# Patient Record
Sex: FEMALE | Race: WHITE | Employment: FULL TIME | URBAN - METROPOLITAN AREA
[De-identification: names, ages, dates, MRNs, and addresses within clinical notes are randomized per-mention and may not be internally consistent; named-entity substitution may affect disease eponyms.]

---

## 2020-08-25 ENCOUNTER — HOSPITAL ENCOUNTER (OUTPATIENT)
Dept: NON INVASIVE DIAGNOSTICS | Age: 45
Discharge: HOME | End: 2020-08-25
Payer: COMMERCIAL

## 2020-08-25 LAB
LV EF: 58 %
LVEF MODALITY: NORMAL

## 2020-08-25 PROCEDURE — 6360000004 HC RX CONTRAST MEDICATION: Performed by: INTERNAL MEDICINE

## 2020-08-25 PROCEDURE — 93017 CV STRESS TEST TRACING ONLY: CPT

## 2020-08-25 PROCEDURE — C8928 TTE W OR W/O FOL W/CON,STRES: HCPCS

## 2020-08-25 RX ADMIN — PERFLUTREN 2.2 MG: 6.52 INJECTION, SUSPENSION INTRAVENOUS at 11:50

## 2020-08-26 ENCOUNTER — OFFICE VISIT (OUTPATIENT)
Dept: CARDIOLOGY CLINIC | Age: 45
End: 2020-08-26
Payer: COMMERCIAL

## 2020-08-26 VITALS
BODY MASS INDEX: 32.3 KG/M2 | HEART RATE: 120 BPM | SYSTOLIC BLOOD PRESSURE: 120 MMHG | WEIGHT: 189.2 LBS | HEIGHT: 64 IN | TEMPERATURE: 98.3 F | DIASTOLIC BLOOD PRESSURE: 80 MMHG

## 2020-08-26 PROCEDURE — 99203 OFFICE O/P NEW LOW 30 MIN: CPT | Performed by: INTERNAL MEDICINE

## 2020-08-26 RX ORDER — HYDROCHLOROTHIAZIDE 25 MG/1
25 TABLET ORAL PRN
COMMUNITY
Start: 2020-07-16 | End: 2022-09-29 | Stop reason: SDUPTHER

## 2020-08-26 RX ORDER — ESCITALOPRAM OXALATE 20 MG/1
20 TABLET ORAL DAILY
COMMUNITY
Start: 2020-02-26

## 2020-08-26 RX ORDER — VALSARTAN 160 MG/1
160 TABLET ORAL DAILY
COMMUNITY
Start: 2020-08-13

## 2020-08-26 RX ORDER — FUROSEMIDE 20 MG/1
20 TABLET ORAL DAILY
COMMUNITY
Start: 2020-08-06 | End: 2022-09-29 | Stop reason: SDUPTHER

## 2020-08-26 RX ORDER — POTASSIUM CHLORIDE 750 MG/1
10 TABLET, FILM COATED, EXTENDED RELEASE ORAL DAILY
COMMUNITY
Start: 2019-01-27 | End: 2022-09-29 | Stop reason: SDUPTHER

## 2020-08-26 NOTE — PROGRESS NOTES
7/16/20  Yes Historical Provider, MD   potassium chloride (KLOR-CON) 10 MEQ extended release tablet Take 10 mEq by mouth 2 times daily 1/27/19  Yes Historical Provider, MD   valsartan (DIOVAN) 160 MG tablet Take 160 mg by mouth daily 8/13/20  Yes Historical Provider, MD        Allergies:  Adhesive tape; Cephalosporins; and Penicillins     Review of Systems:   · Constitutional: there has been no unanticipated weight loss. There's been no change in energy level, sleep pattern, or activity level. · Eyes: No visual changes or diplopia. No scleral icterus. · ENT: No Headaches, hearing loss or vertigo. No mouth sores or sore throat. · Cardiovascular: Reviewed in HPI  · Respiratory: No cough or wheezing, no sputum production. No hematemesis. · Gastrointestinal: No abdominal pain, appetite loss, blood in stools. No change in bowel or bladder habits. · Genitourinary: No dysuria, trouble voiding, or hematuria. · Musculoskeletal:  No gait disturbance, weakness or joint complaints. · Integumentary: No rash or pruritis. · Neurological: No headache, diplopia, change in muscle strength, numbness or tingling. No change in gait, balance, coordination, mood, affect, memory, mentation, behavior. · Psychiatric: No anxiety, no depression. · Endocrine: No malaise, fatigue or temperature intolerance. No excessive thirst, fluid intake, or urination. No tremor. · Hematologic/Lymphatic: No abnormal bruising or bleeding, blood clots or swollen lymph nodes. · Allergic/Immunologic: No nasal congestion or hives.     Physical Examination:    Vitals:    08/26/20 0910   BP: 120/80   Pulse: 120   Temp: 98.3 °F (36.8 °C)        Wt Readings from Last 1 Encounters:   08/26/20 189 lb 3.2 oz (85.8 kg)       Constitutional and General Appearance: NAD    Skin:good turgor,intact without lesions  HEENT: EOMI ,normal  Neck:no JVD       Respiratory:  · Normal excursion and expansion without use of accessory muscles  · Resp Auscultation: Normal in 6 months. I appreciate the opportunity of cooperating in the care of this individual.    Aram Beatty. Aaron Fraser M.D., [address] [facility]  Patient's problem list, medications, allergies, past medical, surgical, social and family histories were reviewed and updated as appropriate. Scribe's attestation: This note was scribed in the presence of Dr. Aaron Fraser MD, by Carlotta Bunn RN. The scribe's documentation has been prepared under my direction and personally reviewed by me in its entirety. I confirm that the note above accurately reflects all work, treatment, procedures, and medical decision making performed by me.

## 2021-04-28 ENCOUNTER — OFFICE VISIT (OUTPATIENT)
Dept: CARDIOLOGY CLINIC | Age: 46
End: 2021-04-28
Payer: COMMERCIAL

## 2021-04-28 VITALS
HEIGHT: 64 IN | BODY MASS INDEX: 29.71 KG/M2 | DIASTOLIC BLOOD PRESSURE: 70 MMHG | SYSTOLIC BLOOD PRESSURE: 100 MMHG | OXYGEN SATURATION: 98 % | WEIGHT: 174 LBS | HEART RATE: 103 BPM

## 2021-04-28 DIAGNOSIS — G47.33 OSA (OBSTRUCTIVE SLEEP APNEA): ICD-10-CM

## 2021-04-28 DIAGNOSIS — R07.9 CHEST PAIN, UNSPECIFIED TYPE: Primary | ICD-10-CM

## 2021-04-28 DIAGNOSIS — I10 ESSENTIAL HYPERTENSION: ICD-10-CM

## 2021-04-28 PROCEDURE — 99214 OFFICE O/P EST MOD 30 MIN: CPT | Performed by: INTERNAL MEDICINE

## 2021-04-28 NOTE — PROGRESS NOTES
Cephalosporins, and Penicillins     Review of Systems:   · Constitutional: there has been no unanticipated weight loss. There's been no change in energy level, sleep pattern, or activity level. · Eyes: No visual changes or diplopia. No scleral icterus. · ENT: No Headaches, hearing loss or vertigo. No mouth sores or sore throat. · Cardiovascular: Reviewed in HPI  · Respiratory: No cough or wheezing, no sputum production. No hematemesis. · Gastrointestinal: No abdominal pain, appetite loss, blood in stools. No change in bowel or bladder habits. · Genitourinary: No dysuria, trouble voiding, or hematuria. · Musculoskeletal:  No gait disturbance, weakness or joint complaints. · Integumentary: No rash or pruritis. · Neurological: No headache, diplopia, change in muscle strength, numbness or tingling. No change in gait, balance, coordination, mood, affect, memory, mentation, behavior. · Psychiatric: No anxiety, no depression. · Endocrine: No malaise, fatigue or temperature intolerance. No excessive thirst, fluid intake, or urination. No tremor. · Hematologic/Lymphatic: No abnormal bruising or bleeding, blood clots or swollen lymph nodes. · Allergic/Immunologic: No nasal congestion or hives.     Physical Examination:    Vitals:    04/28/21 1008   BP: 100/70   Pulse: 103   SpO2: 98%        Wt Readings from Last 1 Encounters:   04/28/21 174 lb (78.9 kg)       Constitutional and General Appearance: NAD    Skin:good turgor,intact without lesions  HEENT: EOMI ,normal  Neck:no JVD       Respiratory:  · Normal excursion and expansion without use of accessory muscles  · Resp Auscultation: Normal breath sounds without dullness  Cardiovascular:  · The apical impulses not displaced  · Heart tones are crisp and normal  · Cervical veins are not engorged  · The carotid upstroke is normal in amplitude and contour without delay or bruit  · Peripheral pulses are symmetrical and full  · There is no clubbing, cyanosis of the extremities. · No edema on exam today   · Femoral Arteries: 2+ and equal  · Pedal Pulses: 2+ and equal   Abdomen:  · No masses or tenderness  · Liver/Spleen: No Abnormalities Noted  Neurological/Psychiatric:  · Alert and oriented in all spheres  · Moves all extremities well  · Exhibits normal gait balance and coordination  · No abnormalities of mood, affect, memory, mentation, or behavior are noted    Stress Echo 8/25/20   Baseline resting echocardiogram shows normal global LV systolic function   with an ejection fraction of 55-60% and uniform myocardial segmental wall   motion. Following stress there was uniform augmentation of all myocardial   segments with appropriate hyperdynamic LV systolic response to stress with   an ejection fraction >65%. Normal stress ECHO. Assessment:    1. Chest pain/Edema- no edema on exam, 8/25/20>  Normal stress ECHO  Edema likely related to venous insufficiency, not related to CHF. CP GI / costochondritis    2. Essential hypertension -controlled-Blood pressure 100/70, pulse 103, height 5' 4\" (1.626 m), weight 174 lb (78.9 kg), SpO2 98 %. 3. WALDEMAR (obstructive sleep apnea) -does not wear CPAP          Plan:  Norberto Alexander has a stable cardiac status. Cardiac test and lab results personally reviewed by me during this office visit and discussed. No med changes   Continue risk factor modifications- monitor salt in your diet. Can also try compression stockings. Call for any change in symptoms, call to report any changes in shortness of breath or development of chest pain with activity. Return for regular follow up in 12 months. I appreciate the opportunity of cooperating in the care of this individual.    Harini Vora. Marion Polanco M.D., [address] [facility]    Patient's problem list, medications, allergies, past medical, surgical, social and family histories were reviewed and updated as appropriate. Scribe's attestation:  This note was scribed in the presence of Dr. Marion Polanco MD, by Jose Vargas Aram Hodgson. The scribe's documentation has been prepared under my direction and personally reviewed by me in its entirety. I confirm that the note above accurately reflects all work, treatment, procedures, and medical decision making performed by me.

## 2022-06-24 PROBLEM — I10 HYPERTENSION: Status: ACTIVE | Noted: 2022-06-24

## 2022-06-24 PROBLEM — G47.33 OSA (OBSTRUCTIVE SLEEP APNEA): Status: ACTIVE | Noted: 2022-06-24

## 2022-08-22 ENCOUNTER — TELEPHONE (OUTPATIENT)
Dept: CARDIOLOGY CLINIC | Age: 47
End: 2022-08-22

## 2022-08-22 NOTE — TELEPHONE ENCOUNTER
Pt called stating that her heart rate is runnin - 140 with any activity. Pt does have a scheduled appt on . Per Pt if she can get in this week that would be appreciated except for Friday she is scheduled for another appt.   Please advise

## 2022-08-22 NOTE — TELEPHONE ENCOUNTER
Called patient to verify symptoms patient stated she is not having chest pain her heart rate keeps going up.

## 2022-08-23 ENCOUNTER — NURSE ONLY (OUTPATIENT)
Dept: CARDIOLOGY CLINIC | Age: 47
End: 2022-08-23
Payer: COMMERCIAL

## 2022-08-23 DIAGNOSIS — R00.2 PALPITATIONS: Primary | ICD-10-CM

## 2022-08-23 PROCEDURE — 93242 EXT ECG>48HR<7D RECORDING: CPT | Performed by: INTERNAL MEDICINE

## 2022-08-23 PROCEDURE — 93000 ELECTROCARDIOGRAM COMPLETE: CPT | Performed by: INTERNAL MEDICINE

## 2022-08-23 NOTE — TELEPHONE ENCOUNTER
LMOM for patient to return call in regards to message below patient need to come into office for EKG and a 7 day monitor Per LES.

## 2022-08-24 NOTE — PROGRESS NOTES
[address]   Cardiac Follow Up     Referring Provider:  JUDE Angeles APRN     Cc-palpitations       History of Present Illness:  Shaunna Baker is a 49 y.o. female here in follow up  for chest pain and edema. She has a history of hypertension. Breast cancer s /p mastectomy. Last ov - her swelling has improved, she is taking lasix daily. Today she reports she has been getting notifications on her apple watch that her HR was elevated. It also recorded an ekg which shows a sinus rhythm, she also feels palpitations during this time. She just finished one round of chemo for recurrent breast cancer and starts a new one soon. Past Medical History:   has a past medical history of Breast cancer ([facility] [address] and Hypertension. Surgical History:   has a past surgical history that includes Mastectomy. bilateral mastectomy    Social History:   reports that she has quit smoking. She has never used smokeless tobacco. She reports current alcohol use. Family History:  family history includes Fainting in her father; Heart Disease in her mother; Hypertension in her father and mother. Home Medications:  Prior to Admission medications    Medication Sig Start Date End Date Taking?  Authorizing Provider   Cyanocobalamin 1000 MCG SUBL Place 1,000 mcg under the tongue daily 7/20/20   Historical Provider, MD   escitalopram (LEXAPRO) 20 MG tablet Take 20 mg by mouth daily 2/26/20   Historical Provider, MD   furosemide (LASIX) 20 MG tablet Take 20 mg by mouth daily 8/6/20   Historical Provider, MD   hydroCHLOROthiazide (HYDRODIURIL) 25 MG tablet Take 25 mg by mouth as needed  7/16/20   Historical Provider, MD   potassium chloride (KLOR-CON) 10 MEQ extended release tablet Take 10 mEq by mouth daily  1/27/19   Historical Provider, MD   valsartan (DIOVAN) 160 MG tablet Take 160 mg by mouth daily 8/13/20   Historical Provider, MD        Allergies:  Adhesive tape, Cephalosporins, and Penicillins     Review of Systems:   Constitutional: there has been no unanticipated weight loss. There's been no change in energy level, sleep pattern, or activity level. Eyes: No visual changes or diplopia. No scleral icterus. ENT: No Headaches, hearing loss or vertigo. No mouth sores or sore throat. Cardiovascular: Reviewed in HPI  Respiratory: No cough or wheezing, no sputum production. No hematemesis. Gastrointestinal: No abdominal pain, appetite loss, blood in stools. No change in bowel or bladder habits. Genitourinary: No dysuria, trouble voiding, or hematuria. Musculoskeletal:  No gait disturbance, weakness or joint complaints. Integumentary: No rash or pruritis. Neurological: No headache, diplopia, change in muscle strength, numbness or tingling. No change in gait, balance, coordination, mood, affect, memory, mentation, behavior. Psychiatric: No anxiety, no depression. Endocrine: No malaise, fatigue or temperature intolerance. No excessive thirst, fluid intake, or urination. No tremor. Hematologic/Lymphatic: No abnormal bruising or bleeding, blood clots or swollen lymph nodes. Allergic/Immunologic: No nasal congestion or hives. Physical Examination:    Vitals:    08/29/22 0845   BP: (!) 110/58   Pulse: 86   SpO2: 98%            Wt Readings from Last 1 Encounters:   04/28/21 174 lb (78.9 kg)       Constitutional and General Appearance: NAD    Skin:good turgor,intact without lesions  HEENT: EOMI ,normal  Neck:no JVD       Respiratory:  Normal excursion and expansion without use of accessory muscles  Resp Auscultation: Normal breath sounds without dullness  Cardiovascular: The apical impulses not displaced  Heart tones are crisp and normal  Cervical veins are not engorged  The carotid upstroke is normal in amplitude and contour without delay or bruit  Peripheral pulses are symmetrical and full  There is no clubbing, cyanosis of the extremities.   No edema on exam today   Femoral Arteries: 2+ and equal  Pedal Pulses: 2+ and equal   Abdomen:  No masses or tenderness  Liver/Spleen: No Abnormalities Noted  Neurological/Psychiatric:  Alert and oriented in all spheres  Moves all extremities well  Exhibits normal gait balance and coordination  No abnormalities of mood, affect, memory, mentation, or behavior are noted    Stress Echo 8/25/20   Baseline resting echocardiogram shows normal global LV systolic function   with an ejection fraction of 55-60% and uniform myocardial segmental wall   motion. Following stress there was uniform augmentation of all myocardial   segments with appropriate hyperdynamic LV systolic response to stress with   an ejection fraction >65%. Normal stress ECHO. Assessment:    1. Chest pain/Edema- no edema on exam, 8/25/20>  Normal stress ECHO  Edema likely related to venous insufficiency, not related to CHF. CP GI / costochondritis    2. Essential hypertension -controlled-There were no vitals taken for this visit. 3. WALDEMAR (obstructive sleep apnea) -does not wear CPAP          Plan:  Richar Cevallos has a stable cardiac status. Cardiac test and lab results personally reviewed by me during this office visit and discussed. No med changes   Continue risk factor modifications- monitor salt in your diet. Can also try compression stockings. Call for any change in symptoms, call to report any changes in shortness of breath or development of chest pain with activity. Return for regular follow up in 12 months. I appreciate the opportunity of cooperating in the care of this individual.    Willi Ferris. Gris Ta M.D., [facility]    Patient's problem list, medications, allergies, past medical, surgical, social and family histories were reviewed and updated as appropriate. Scribe's Attestation: This note was scribed in the presence of Dr. Shilo Causey MD by Lella Dakin, RN. The scribe's documentation has been prepared under my direction and personally reviewed by me in its entirety.  I confirm that the note above accurately reflects all work, treatment, procedures, and medical decision making performed by me.

## 2022-08-29 ENCOUNTER — OFFICE VISIT (OUTPATIENT)
Dept: CARDIOLOGY CLINIC | Age: 47
End: 2022-08-29
Payer: COMMERCIAL

## 2022-08-29 VITALS
DIASTOLIC BLOOD PRESSURE: 58 MMHG | WEIGHT: 170.5 LBS | HEART RATE: 86 BPM | HEIGHT: 64 IN | SYSTOLIC BLOOD PRESSURE: 110 MMHG | OXYGEN SATURATION: 98 % | BODY MASS INDEX: 29.11 KG/M2

## 2022-08-29 DIAGNOSIS — R00.0 TACHYCARDIA: ICD-10-CM

## 2022-08-29 DIAGNOSIS — R00.2 PALPITATIONS: Primary | ICD-10-CM

## 2022-08-29 PROCEDURE — 99214 OFFICE O/P EST MOD 30 MIN: CPT | Performed by: INTERNAL MEDICINE

## 2022-08-29 RX ORDER — OLANZAPINE 5 MG/1
10 TABLET ORAL NIGHTLY
COMMUNITY
Start: 2022-07-14

## 2022-08-29 RX ORDER — SCOLOPAMINE TRANSDERMAL SYSTEM 1 MG/1
PATCH, EXTENDED RELEASE TRANSDERMAL
COMMUNITY
Start: 2022-08-18

## 2022-08-29 RX ORDER — PROCHLORPERAZINE MALEATE 10 MG
10 TABLET ORAL EVERY 6 HOURS PRN
COMMUNITY
Start: 2022-07-01

## 2022-09-06 PROCEDURE — 93244 EXT ECG>48HR<7D REV&INTERPJ: CPT | Performed by: INTERNAL MEDICINE

## 2022-09-27 ENCOUNTER — TELEPHONE (OUTPATIENT)
Dept: CARDIOLOGY CLINIC | Age: 47
End: 2022-09-27

## 2022-09-27 NOTE — TELEPHONE ENCOUNTER
Called to touch based with Dewayne Gaffney. Her sister answered the phone and states they are going back to be seen right now at hospital in [place].

## 2022-09-27 NOTE — TELEPHONE ENCOUNTER
Patient called in and stated that her HR was running in the 158-170 she was feeling lightheaded and loopy.  Patient stated that she was going to go to the ED and she would call our office later to make an appointment if she needed to

## 2022-09-29 ENCOUNTER — OFFICE VISIT (OUTPATIENT)
Dept: CARDIOLOGY CLINIC | Age: 47
End: 2022-09-29
Payer: COMMERCIAL

## 2022-09-29 VITALS
WEIGHT: 173.3 LBS | BODY MASS INDEX: 29.59 KG/M2 | DIASTOLIC BLOOD PRESSURE: 70 MMHG | OXYGEN SATURATION: 99 % | SYSTOLIC BLOOD PRESSURE: 114 MMHG | HEART RATE: 102 BPM | HEIGHT: 64 IN

## 2022-09-29 DIAGNOSIS — R60.0 LOWER LEG EDEMA: ICD-10-CM

## 2022-09-29 DIAGNOSIS — G47.33 OSA (OBSTRUCTIVE SLEEP APNEA): ICD-10-CM

## 2022-09-29 DIAGNOSIS — R00.0 TACHYCARDIA: Primary | ICD-10-CM

## 2022-09-29 PROCEDURE — 99214 OFFICE O/P EST MOD 30 MIN: CPT | Performed by: NURSE PRACTITIONER

## 2022-09-29 RX ORDER — HYDROCHLOROTHIAZIDE 25 MG/1
25 TABLET ORAL PRN
Qty: 30 TABLET | Refills: 2 | Status: SHIPPED | OUTPATIENT
Start: 2022-09-29

## 2022-09-29 RX ORDER — FUROSEMIDE 20 MG/1
20 TABLET ORAL DAILY
Qty: 60 TABLET | Refills: 3 | Status: SHIPPED | OUTPATIENT
Start: 2022-09-29

## 2022-09-29 RX ORDER — POTASSIUM CHLORIDE 750 MG/1
10 TABLET, FILM COATED, EXTENDED RELEASE ORAL DAILY
Qty: 60 TABLET | Refills: 3 | Status: SHIPPED | OUTPATIENT
Start: 2022-09-29 | End: 2022-10-16 | Stop reason: SDUPTHER

## 2022-09-29 RX ORDER — METOPROLOL SUCCINATE 25 MG/1
TABLET, EXTENDED RELEASE ORAL
COMMUNITY
Start: 2022-09-28

## 2022-09-29 NOTE — PROGRESS NOTES
[address]     Outpatient Follow Up Note    CHIEF COMPLAINT / HPI:  Follow Up secondary to tachycardia     Subjective:   Eulalia Stanley is 49 y.o. female who presents today with a history of HNT, tachycardia, breast cancer s/p mastectomy. Admitted 9/27/22 with tachycardia at [facility]. She had just received chemo (Neulasta) 9/26. She was started on Toprol 25 BID. Lasix, diovan, HTCZ stopped due to hypotension. Today, Ms Yuki Shah says her tachycardia is better controlled on Toprol. She complains of swelling and weight gain since stopping her diuretics. She also notices some shortness of breath when she is tachycardic. Ms Ykui Shah has two more treatments of taxol and then will have radiation. With regard to medication therapy the patient has been compliant with prescribed regimen. They have tolerated therapy to date.      Past Medical History:   Diagnosis Date    Breast cancer ([facility] [address]     Hypertension      Social History:    Social History     Tobacco Use   Smoking Status Former   Smokeless Tobacco Never     Current Medications:  Current Outpatient Medications   Medication Sig Dispense Refill    prochlorperazine (COMPAZINE) 10 MG tablet Take 10 mg by mouth every 6 hours as needed      scopolamine (TRANSDERM-SCOP) transdermal patch       escitalopram (LEXAPRO) 20 MG tablet Take 20 mg by mouth daily      metoprolol succinate (TOPROL XL) 25 MG extended release tablet       OLANZapine (ZYPREXA) 5 MG tablet Take 10 mg by mouth nightly (Patient not taking: Reported on 9/29/2022)      Cyanocobalamin 1000 MCG SUBL Place 1,000 mcg under the tongue daily (Patient not taking: Reported on 9/29/2022)      furosemide (LASIX) 20 MG tablet Take 20 mg by mouth daily (Patient not taking: Reported on 9/29/2022)      hydroCHLOROthiazide (HYDRODIURIL) 25 MG tablet Take 25 mg by mouth as needed  (Patient not taking: Reported on 9/29/2022)      potassium chloride (KLOR-CON) 10 MEQ extended release tablet Take 10 mEq by mouth daily  (Patient not taking: Reported on 9/29/2022)      valsartan (DIOVAN) 160 MG tablet Take 160 mg by mouth daily (Patient not taking: Reported on 9/29/2022)       No current facility-administered medications for this visit. REVIEW OF SYSTEMS:    CONSTITUTIONAL: No major weight gain or loss, fatigue, weakness, night sweats or fever. HEENT: No new vision difficulties or ringing in the ears. RESPIRATORY: No new SOB, PND, orthopnea or cough. CARDIOVASCULAR: See HPI  GI: No nausea, vomiting, diarrhea, constipation, abdominal pain or changes in bowel habits. : No urinary frequency, urgency, incontinence hematuria or dysuria. SKIN: No cyanosis or skin lesions. MUSCULOSKELETAL: No new muscle or joint pain. NEUROLOGICAL: No syncope or TIA-like symptoms. PSYCHIATRIC: No anxiety, pain, insomnia or depression    Objective:   PHYSICAL EXAM:      Wt Readings from Last 3 Encounters:   09/29/22 173 lb 4.8 oz (78.6 kg)   08/29/22 170 lb 8 oz (77.3 kg)   04/28/21 174 lb (78.9 kg)          VITALS:  /70 (Site: Left Upper Arm, Position: Sitting, Cuff Size: Large Adult)   Pulse (!) 102   Ht 5' 4\" (1.626 m)   Wt 173 lb 4.8 oz (78.6 kg)   SpO2 99%   BMI 29.75 kg/m²   CONSTITUTIONAL: Cooperative, no apparent distress, and appears well nourished / developed  NEUROLOGIC:  Awake and orientated to person, place and time. PSYCH: Calm affect. SKIN: Warm and dry. HEENT: Sclera non-icteric, normocephalic, neck supple, no elevation of JVP, normal carotid pulses with no bruits and thyroid normal size. LUNGS:  No increased work of breathing and clear to auscultation, no crackles or wheezing  CARDIOVASCULAR:  Regular rate and rhythm with no murmurs, gallops, rubs, or abnormal heart sounds, normal PMI. The apical impulses not displaced  Heart tones are crisp and normal  Cervical veins are not engorged  The carotid upstroke is normal in amplitude and contour without delay or bruit  JVP is not elevated  ABDOMEN:  Normal bowel sounds, non-distended and non-tender to palpation  EXT: No edema, no calf tenderness. Pulses are present bilaterally. DATA:    No results found for: ALT, AST, GGT, ALKPHOS, BILITOT  Lab Results   Component Value Date    CREATININE 0.7 2011    BUN 17 2011     2011    K 3.9 2011     2011    CO2 27 2011     No results found for: TSH, F8AEAON, J5EIWMN, THYROIDAB  No results found for: WBC, HGB, HCT, MCV, PLT  No components found for: CHLPL  No results found for: TRIG  No results found for: HDL  No results found for: LDLCALC  No results found for: LABVLDL       No results found for: BNP  Radiology Review:  Pertinent images / reports were reviewed as a part of this visit and reveals the following:    Last Echo: 22  Summary:   Overall left ventricular ejection fraction is estimated to be 60-65%. Left ventricular systolic function is normal. (LVEF>/=55%)   Mild concentric left ventricular hypertrophy. Left ventricular wall motion is normal.   Diastolic function is impaired and classified as Grade 1 (impaired   relaxation). Last Stress Echo Test: 22    Summary   Baseline resting echocardiogram shows normal global LV systolic function with an ejection fraction of 55-60% and uniform myocardial segmental wall motion. Following stress there was uniform augmentation of all myocardial segments with appropriate hyperdynamic LV systolic response to stress with an ejection fraction >65%. Normal stress ECHO. Last EC22  Sinus  Rhythm   Low voltage in precordial leads. Assessment:      Diagnosis Orders   1. Tachycardia   ~ better controlled on Toprol 25 BID       2. Lower leg edema   ~ worsening, + slight weight gain  ~ diuretics stopped at discharge from hospital       3. WALDEMAR (obstructive sleep apnea)   ~ encouraged compliance with CPAP         I had the opportunity to review the clinical symptoms and presentation of Satya Robb.    Plan: Resume lasix 20 mg daily along with potassium   OK to take HCTZ PRN as you had been  Monitor BP 2 hours after you take your medication. Let me know if BP dropping too low/you are symptomatic. If BP continues to rise, will resume valsartan. RTO 6 months with Dr Helder Guzman     Overall the patient is stable from CV standpoint    I have addresed the patient's cardiac risk factors and adjusted pharmacologic treatment as needed. In addition, I have reinforced the need for patient directed risk factor modification. Further evaluation will be based upon the patient's clinical course and testing results. All questions and concerns were addressed to the patient/family (sister)    The patient is not currently smoking. The risks related to smoking were reviewed with the patient. Recommend maintaining a smoke-free lifestyle. Patient is on a beta-blocker  Patient is on an ace-i/ARB  Patient is not on a statin; no HLD    The patient verbalizes understanding not to stop medications without discussing with us. Discussed exercise: 30-60 minutes 7 days/week  Discussed Low saturated fat/HUGO diet. Thank you for allowing to us to participate in the care of Eulalia Stanley.     Electronically signed by JUDE Reinoso CNP on 9/29/2022 at 1:06 PM     Documentation of today's visit sent to PCP

## 2022-10-03 PROBLEM — R00.0 TACHYCARDIA: Status: ACTIVE | Noted: 2022-10-03

## 2022-10-03 PROBLEM — R60.0 LOWER LEG EDEMA: Status: ACTIVE | Noted: 2022-10-03

## 2022-10-17 RX ORDER — POTASSIUM CHLORIDE 750 MG/1
10 TABLET, FILM COATED, EXTENDED RELEASE ORAL DAILY
Qty: 60 TABLET | Refills: 5 | Status: SHIPPED | OUTPATIENT
Start: 2022-10-17 | End: 2022-10-19

## 2022-10-17 NOTE — TELEPHONE ENCOUNTER
Received refill request for Potassium Chloride from [id number] : 9/29/22 NPKA    Last Labs : 9/23/22 Care everywhere    Last Refill : 9/29/22 60w3 refills     Next OV : 3/29/23 LES

## 2022-10-19 RX ORDER — POTASSIUM CHLORIDE 1500 MG/1
20 TABLET, FILM COATED, EXTENDED RELEASE ORAL DAILY
Qty: 90 TABLET | Refills: 2 | Status: SHIPPED | OUTPATIENT
Start: 2022-10-19

## 2022-11-08 NOTE — TELEPHONE ENCOUNTER
Received refill request for Metoprolol from [facility].     Last ov: 08/29/2022 LES    Last Refill: Historical medication    Next appointment: 03/29/2023 LES

## 2022-11-08 NOTE — TELEPHONE ENCOUNTER
Medication Refill    Medication needing refilled:  metoprolol succinate (TOPROL XL)    Dosage of the medication:  25 MG extended release tablet     How are you taking this medication (QD, BID, TID, QID, PRN): 1 tablet daily    30 or 90 day supply called in:  30 day    When will you run out of your medication:  Thursday    Which Pharmacy are we sending the medication to?:    [id number], [address] [place] [address] [phone]   [address] [facility], [facility] [address]   Phone:  [phone]  Fax:  [phone]

## 2022-11-09 RX ORDER — METOPROLOL SUCCINATE 25 MG/1
25 TABLET, EXTENDED RELEASE ORAL DAILY
Qty: 30 TABLET | Refills: 5 | Status: SHIPPED | OUTPATIENT
Start: 2022-11-09

## 2022-12-12 ENCOUNTER — PROCEDURE VISIT (OUTPATIENT)
Dept: CARDIOLOGY CLINIC | Age: 47
End: 2022-12-12

## 2022-12-12 DIAGNOSIS — R00.0 TACHYCARDIA: ICD-10-CM

## 2022-12-12 DIAGNOSIS — R00.2 PALPITATIONS: ICD-10-CM

## 2023-02-08 NOTE — TELEPHONE ENCOUNTER
Received refill request for Furosemide 20mg and Potassium 20meq from [facility] on [address] pharmacy.      Last OV:  8- LES    Next OV: 3- LES    Last Labs: 12-6-2011 BMP    Last Filled:  9- Furosemide NPKA                    10- Pot Chlor NPKA

## 2023-02-08 NOTE — TELEPHONE ENCOUNTER
Received refill request for Metoprolol Succinate 25mg from [facility] [address] pharmacy.      Last OV: 8- LES    Next OV: 3- ANNA    Last Labs: 12-6-2011 BMP    Last Filled:  11-9-2022 LES

## 2023-02-09 ENCOUNTER — PATIENT MESSAGE (OUTPATIENT)
Dept: CARDIOLOGY CLINIC | Age: 48
End: 2023-02-09

## 2023-02-09 RX ORDER — FUROSEMIDE 20 MG/1
20 TABLET ORAL DAILY
Qty: 60 TABLET | Refills: 3 | Status: SHIPPED | OUTPATIENT
Start: 2023-02-09

## 2023-02-09 RX ORDER — POTASSIUM CHLORIDE 1500 MG/1
20 TABLET, FILM COATED, EXTENDED RELEASE ORAL DAILY
Qty: 90 TABLET | Refills: 2 | Status: SHIPPED | OUTPATIENT
Start: 2023-02-09

## 2023-02-09 RX ORDER — METOPROLOL SUCCINATE 25 MG/1
25 TABLET, EXTENDED RELEASE ORAL DAILY
Qty: 90 TABLET | Refills: 3 | Status: SHIPPED | OUTPATIENT
Start: 2023-02-09

## 2023-02-10 NOTE — TELEPHONE ENCOUNTER
From: Anastasiia Camacho  To: Dr. Barajas Cast: 2/9/2023 5:51 PM EST  Subject: Potassium     I wanted to get your thoughts on potassium dosage     I just had bloodwork done at [facility] with oncology and my potassium was 3.3 they increased my dosage to 10 meq 2 tablets twice daily with one refill. Should I take a full 20 meq for second dose? Or just do a few times a week? And keep my normal 20 meq 1 tab daily? Please call if this is confusing. Have a little chemo brain and sometimes difficult to explain what I need in a text. I just know that I will drop occasionally in my potassium. I also have started Diamox which can cause neuropathy and fatigue- wondering if my lower potassium is intensifying the symptoms.  I was thinking possibly the 1 tablet of 10 meq for second dose my be beneficial.   Thank you

## 2023-03-08 NOTE — PROGRESS NOTES
fraction of 60%. -No regional wall motion abnormalities are seen. -GLS -19.1% (normal). -Normal diastolic function.  -Trivial mitral and tricuspid regurgitation. Assessment:    1. Chest pain/Edema-   C/o right ankle swelling  8/25/20>  Normal stress ECHO  Edema likely related to venous insufficiency, not related to CHF. CP GI / costochondritis   Pt is taking lasix daily      2. Essential hypertension -  controlled-  Blood pressure 112/76, pulse 81, height 5' 4\" (1.626 m), weight 164 lb (74.4 kg), SpO2 97 %. Continue current medication regimen     3. WALDEMAR (obstructive sleep apnea)   -does not wear CPAP      4 Tachycardia      Controlled on Toprol XL 25 mg daily      HR today 81     Plan:  Cardiac test and lab results personally reviewed by me during this office visit and discussed. Consider compression stockings for ankle swelling (10-20 mmHg)  Continue risk factor modifications. Call for any change in symptoms, call to report any changes in shortness of breath or development of chest pain with activity. Follow up in 12 mos          I appreciate the opportunity of cooperating in the care of this individual.    Ted Guardado M.D., [facility] [place]    Patient's problem list, medications, allergies, past medical, surgical, social and family histories were reviewed and updated as appropriate. Scribe's attestation: This note was scribed in the presence of Dr Rosario Guardado by Tiago Sutton, KATHIA. The scribe's documentation has been prepared under my direction and personally reviewed by me in its entirety. I confirm that the note above accurately reflects all work, treatment, procedures, and medical decision making performed by me.

## 2023-03-29 ENCOUNTER — OFFICE VISIT (OUTPATIENT)
Dept: CARDIOLOGY CLINIC | Age: 48
End: 2023-03-29
Payer: COMMERCIAL

## 2023-03-29 VITALS
SYSTOLIC BLOOD PRESSURE: 112 MMHG | WEIGHT: 164 LBS | HEART RATE: 81 BPM | DIASTOLIC BLOOD PRESSURE: 76 MMHG | OXYGEN SATURATION: 97 % | HEIGHT: 64 IN | BODY MASS INDEX: 28 KG/M2

## 2023-03-29 DIAGNOSIS — R60.0 LOWER LEG EDEMA: ICD-10-CM

## 2023-03-29 DIAGNOSIS — R07.9 CHEST PAIN, UNSPECIFIED TYPE: Primary | ICD-10-CM

## 2023-03-29 DIAGNOSIS — R00.0 TACHYCARDIA: ICD-10-CM

## 2023-03-29 DIAGNOSIS — G47.33 OSA (OBSTRUCTIVE SLEEP APNEA): ICD-10-CM

## 2023-03-29 DIAGNOSIS — I10 ESSENTIAL HYPERTENSION: ICD-10-CM

## 2023-03-29 PROCEDURE — 3078F DIAST BP <80 MM HG: CPT | Performed by: INTERNAL MEDICINE

## 2023-03-29 PROCEDURE — 99214 OFFICE O/P EST MOD 30 MIN: CPT | Performed by: INTERNAL MEDICINE

## 2023-03-29 PROCEDURE — 3074F SYST BP LT 130 MM HG: CPT | Performed by: INTERNAL MEDICINE

## 2023-03-29 RX ORDER — BIOTIN 1 MG
1000 TABLET ORAL DAILY
COMMUNITY
Start: 2022-10-21

## 2023-03-29 RX ORDER — ANASTROZOLE 1 MG/1
TABLET ORAL
COMMUNITY
Start: 2023-03-10

## 2023-03-29 RX ORDER — ACETAZOLAMIDE 500 MG/1
CAPSULE, EXTENDED RELEASE ORAL
COMMUNITY
Start: 2023-03-10

## 2023-03-29 NOTE — PATIENT INSTRUCTIONS
Consider compression stockings for ankle swelling (10-20 mmHg)  Continue risk factor modifications. Call for any change in symptoms, call to report any changes in shortness of breath or development of chest pain with activity.     Follow up in 12 mos

## 2023-11-06 NOTE — PROGRESS NOTES
alone or drive yourself home. It is strongly suggested someone stay with you the first 24 hrs. Your surgery will be cancelled if you do not have a ride home. 8. A parent/legal guardian must accompany a child scheduled for surgery and plan to stay at the hospital until the child is discharged. Please do not bring other children with you. 9. Please wear simple, loose fitting clothing to the hospital.  Fabrizio Arguello not bring valuables (money, credit cards, checkbooks, etc.) Do not wear any makeup (including no eye makeup) or nail polish on your fingers or toes. 10. DO NOT wear any jewelry or piercings on day of surgery. All body piercing jewelry must be removed. 11. If you have ___dentures, they will be removed before going to the OR; we will provide you a container. If you wear ___contact lenses or _x__glasses, they will be removed; please bring a case for them. 12. Please see your family doctor/pediatrician for a history & physical and/or concerning medications. Bring any test results/reports from your physician's office. PCP__________________Phone___________H&P Appt. Date________             13 If you  have a Living Will and Durable Power of  for Healthcare, please bring in a copy. 15. Notify your Surgeon if you develop any illness between now and surgery  time, cough, cold, fever, sore throat, nausea, vomiting, etc.  Please notify your surgeon if you experience dizziness, shortness of breath or blurred vision between now & the time of your surgery             15. DO NOT shave your operative site 96 hours prior to surgery. For face & neck surgery, men may use an electric razor 48 hours prior to surgery. 16. Shower the night before or morning of surgery using an antibacterial soap or as you have been instructed. 17. To provide excellent care visitors will be limited to one in the room at any given time.              18.  Please bring picture ID and insurance card. 19.  Visit our web site for additional information:  [employer] "[email or web address]"/[email or web address]              20.During flu season no children under the age of 15 are permitted in the hospital for the safety of all patients. 21. If you take a long acting insulin in the evening only  take half of your usual  dose the night  before your procedure              22. If you use a c-pap please bring DOS if staying overnight,             23.For your convenience [facility] has a pharmacy on site to fill your prescriptions. 24. If you use oxygen and have a portable tank please bring it  with you the DOS             25. Bring a complete list of all your medications with name and dose include any supplements. 26. Other__________________________________________   *Please call pre admission testing if you any further questions   Antoinette Kang         [address] [facility]  [phone]   [address] [place] [address]       VISITOR POLICY(subject to change)    Current policy is 2 visitors per patient. No children. Mask is  at the discretion of the facility. Visiting hours are 8a-8p. Overnight visitors will be at the discretion of the nurse. All policies subject to change. All above information reviewed with patient in person or by phone. Patient verbalizes understanding. All questions and concerns addressed.                                                                                                  Patient/Rep_patient___________________                                                                                                                                    PRE OP INSTRUCTIONS

## 2023-11-07 NOTE — H&P
[address] [facility]                     [address] [place] [id number]                       PREOPERATIVE HISTORY AND PHYSICAL    PATIENT NAME: Amanda Estrada                     :        1975  MED REC NO:   [id number]                          ROOM:  ACCOUNT NO:   [de-identified]                           ADMIT DATE: 2023  PROVIDER:     Doreen Yuan MD    PLANNED SURGICAL DATE:  2023    DIAGNOSIS:  Capsular contracture bilateral breasts. PLANNED PROCEDURE:  Right capsulotomy and revision left reconstructed  breast.    INDICATIONS:  The patient is well known to me and unfortunately had a  recent recurrence of the right axilla area. She then underwent another  lumpectomy where four and a half nodes were noted. 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 After discussing the options at length, we  elected to proceed with bilateral capsulotomy as well as revision of  left reconstructed breast by improving the left inframammary crease. The issue of bleeding, infection, wound dehiscence, scarring, as well as  the issue of recurrent contracture as well as asymmetry obviously was  discussed at length with her and a detailed consent form can be found in  her chart. PAST MEDICAL HISTORY:  Significant for high blood pressure. Obviously  the breast carcinoma, which has recurred. PAST SURGICAL HISTORY:  Her surgical history was the initial  reconstruction back in  as well as hysterectomy and bilateral  oophorectomy in . ALLERGIES:  She is allergic to PENICILLIN. MEDICATIONS:  She currently takes Hyzaar and Lexapro. PHYSICAL EXAMINATION:  GENERAL:  Reveals a pleasant alert female in no apparent distress. VITAL SIGNS:  Stable vital signs. LUNGS:  Clear. HEART:  Regular rate and rhythm.   BREASTS:  Again

## 2023-11-08 ENCOUNTER — ANESTHESIA EVENT (OUTPATIENT)
Dept: OPERATING ROOM | Age: 48
End: 2023-11-08
Payer: COMMERCIAL

## 2023-11-08 ENCOUNTER — HOSPITAL ENCOUNTER (OUTPATIENT)
Age: 48
Setting detail: OUTPATIENT SURGERY
Discharge: HOME | End: 2023-11-08
Attending: PLASTIC SURGERY | Admitting: PLASTIC SURGERY
Payer: COMMERCIAL

## 2023-11-08 ENCOUNTER — ANESTHESIA (OUTPATIENT)
Dept: OPERATING ROOM | Age: 48
End: 2023-11-08
Payer: COMMERCIAL

## 2023-11-08 VITALS
DIASTOLIC BLOOD PRESSURE: 88 MMHG | SYSTOLIC BLOOD PRESSURE: 116 MMHG | WEIGHT: 168 LBS | TEMPERATURE: 97.8 F | BODY MASS INDEX: 28.68 KG/M2 | HEART RATE: 106 BPM | HEIGHT: 64 IN | OXYGEN SATURATION: 97 % | RESPIRATION RATE: 13 BRPM

## 2023-11-08 DIAGNOSIS — Z85.3 PERSONAL HISTORY OF MALIGNANT NEOPLASM OF BREAST: Primary | ICD-10-CM

## 2023-11-08 PROCEDURE — 6360000002 HC RX W HCPCS: Performed by: NURSE ANESTHETIST, CERTIFIED REGISTERED

## 2023-11-08 PROCEDURE — A4217 STERILE WATER/SALINE, 500 ML: HCPCS | Performed by: PLASTIC SURGERY

## 2023-11-08 PROCEDURE — 2709999900 HC NON-CHARGEABLE SUPPLY: Performed by: PLASTIC SURGERY

## 2023-11-08 PROCEDURE — 2580000003 HC RX 258: Performed by: PLASTIC SURGERY

## 2023-11-08 PROCEDURE — 3600000013 HC SURGERY LEVEL 3 ADDTL 15MIN: Performed by: PLASTIC SURGERY

## 2023-11-08 PROCEDURE — 6360000002 HC RX W HCPCS: Performed by: PLASTIC SURGERY

## 2023-11-08 PROCEDURE — 3700000000 HC ANESTHESIA ATTENDED CARE: Performed by: PLASTIC SURGERY

## 2023-11-08 PROCEDURE — 3600000003 HC SURGERY LEVEL 3 BASE: Performed by: PLASTIC SURGERY

## 2023-11-08 PROCEDURE — 3700000001 HC ADD 15 MINUTES (ANESTHESIA): Performed by: PLASTIC SURGERY

## 2023-11-08 PROCEDURE — 7100000000 HC PACU RECOVERY - FIRST 15 MIN: Performed by: PLASTIC SURGERY

## 2023-11-08 PROCEDURE — 6370000000 HC RX 637 (ALT 250 FOR IP)

## 2023-11-08 PROCEDURE — 7100000011 HC PHASE II RECOVERY - ADDTL 15 MIN: Performed by: PLASTIC SURGERY

## 2023-11-08 PROCEDURE — 6360000002 HC RX W HCPCS: Performed by: ANESTHESIOLOGY

## 2023-11-08 PROCEDURE — 2500000003 HC RX 250 WO HCPCS: Performed by: NURSE ANESTHETIST, CERTIFIED REGISTERED

## 2023-11-08 PROCEDURE — 7100000010 HC PHASE II RECOVERY - FIRST 15 MIN: Performed by: PLASTIC SURGERY

## 2023-11-08 PROCEDURE — 7100000001 HC PACU RECOVERY - ADDTL 15 MIN: Performed by: PLASTIC SURGERY

## 2023-11-08 RX ORDER — ONDANSETRON 2 MG/ML
4 INJECTION INTRAMUSCULAR; INTRAVENOUS
Status: DISCONTINUED | OUTPATIENT
Start: 2023-11-08 | End: 2023-11-08 | Stop reason: HOSPADM

## 2023-11-08 RX ORDER — MIDAZOLAM HYDROCHLORIDE 1 MG/ML
INJECTION INTRAMUSCULAR; INTRAVENOUS PRN
Status: DISCONTINUED | OUTPATIENT
Start: 2023-11-08 | End: 2023-11-08 | Stop reason: SDUPTHER

## 2023-11-08 RX ORDER — LIDOCAINE HYDROCHLORIDE 10 MG/ML
0.5 INJECTION, SOLUTION EPIDURAL; INFILTRATION; INTRACAUDAL; PERINEURAL ONCE
Status: DISCONTINUED | OUTPATIENT
Start: 2023-11-08 | End: 2023-11-08 | Stop reason: HOSPADM

## 2023-11-08 RX ORDER — ONDANSETRON 2 MG/ML
INJECTION INTRAMUSCULAR; INTRAVENOUS PRN
Status: DISCONTINUED | OUTPATIENT
Start: 2023-11-08 | End: 2023-11-08 | Stop reason: SDUPTHER

## 2023-11-08 RX ORDER — EPHEDRINE SULFATE/0.9% NACL/PF 50 MG/5 ML
SYRINGE (ML) INTRAVENOUS PRN
Status: DISCONTINUED | OUTPATIENT
Start: 2023-11-08 | End: 2023-11-08

## 2023-11-08 RX ORDER — BUPIVACAINE HYDROCHLORIDE 5 MG/ML
INJECTION, SOLUTION EPIDURAL; INTRACAUDAL
Status: COMPLETED | OUTPATIENT
Start: 2023-11-08 | End: 2023-11-08

## 2023-11-08 RX ORDER — PROPOFOL 10 MG/ML
INJECTION, EMULSION INTRAVENOUS PRN
Status: DISCONTINUED | OUTPATIENT
Start: 2023-11-08 | End: 2023-11-08 | Stop reason: SDUPTHER

## 2023-11-08 RX ORDER — SODIUM CHLORIDE 0.9 % (FLUSH) 0.9 %
5-40 SYRINGE (ML) INJECTION EVERY 12 HOURS SCHEDULED
Status: DISCONTINUED | OUTPATIENT
Start: 2023-11-08 | End: 2023-11-08 | Stop reason: HOSPADM

## 2023-11-08 RX ORDER — SODIUM CHLORIDE 9 MG/ML
INJECTION, SOLUTION INTRAVENOUS PRN
Status: DISCONTINUED | OUTPATIENT
Start: 2023-11-08 | End: 2023-11-08 | Stop reason: HOSPADM

## 2023-11-08 RX ORDER — SODIUM CHLORIDE 0.9 % (FLUSH) 0.9 %
5-40 SYRINGE (ML) INJECTION PRN
Status: DISCONTINUED | OUTPATIENT
Start: 2023-11-08 | End: 2023-11-08 | Stop reason: HOSPADM

## 2023-11-08 RX ORDER — OXYCODONE HYDROCHLORIDE 5 MG/1
5 TABLET ORAL ONCE
Status: COMPLETED | OUTPATIENT
Start: 2023-11-08 | End: 2023-11-08

## 2023-11-08 RX ORDER — EPHEDRINE SULFATE 50 MG/ML
INJECTION INTRAVENOUS PRN
Status: DISCONTINUED | OUTPATIENT
Start: 2023-11-08 | End: 2023-11-08 | Stop reason: SDUPTHER

## 2023-11-08 RX ORDER — MEPERIDINE HYDROCHLORIDE 25 MG/ML
12.5 INJECTION INTRAMUSCULAR; INTRAVENOUS; SUBCUTANEOUS EVERY 5 MIN PRN
Status: DISCONTINUED | OUTPATIENT
Start: 2023-11-08 | End: 2023-11-08 | Stop reason: HOSPADM

## 2023-11-08 RX ORDER — LORATADINE 10 MG/1
10 CAPSULE, LIQUID FILLED ORAL DAILY
COMMUNITY

## 2023-11-08 RX ORDER — HYDROMORPHONE HYDROCHLORIDE 2 MG/ML
0.5 INJECTION, SOLUTION INTRAMUSCULAR; INTRAVENOUS; SUBCUTANEOUS EVERY 5 MIN PRN
Status: DISCONTINUED | OUTPATIENT
Start: 2023-11-08 | End: 2023-11-08 | Stop reason: HOSPADM

## 2023-11-08 RX ORDER — OXYCODONE HYDROCHLORIDE 5 MG/1
5 TABLET ORAL EVERY 6 HOURS PRN
Qty: 25 TABLET | Refills: 0 | Status: SHIPPED | OUTPATIENT
Start: 2023-11-08 | End: 2023-11-15

## 2023-11-08 RX ORDER — FENTANYL CITRATE 50 UG/ML
INJECTION, SOLUTION INTRAMUSCULAR; INTRAVENOUS PRN
Status: DISCONTINUED | OUTPATIENT
Start: 2023-11-08 | End: 2023-11-08 | Stop reason: SDUPTHER

## 2023-11-08 RX ORDER — OXYCODONE HYDROCHLORIDE 5 MG/1
TABLET ORAL
Status: COMPLETED
Start: 2023-11-08 | End: 2023-11-08

## 2023-11-08 RX ORDER — PHENYLEPHRINE HYDROCHLORIDE 10 MG/ML
INJECTION INTRAVENOUS PRN
Status: DISCONTINUED | OUTPATIENT
Start: 2023-11-08 | End: 2023-11-08 | Stop reason: SDUPTHER

## 2023-11-08 RX ORDER — LIDOCAINE HYDROCHLORIDE 20 MG/ML
INJECTION, SOLUTION EPIDURAL; INFILTRATION; INTRACAUDAL; PERINEURAL PRN
Status: DISCONTINUED | OUTPATIENT
Start: 2023-11-08 | End: 2023-11-08 | Stop reason: SDUPTHER

## 2023-11-08 RX ORDER — SULFAMETHOXAZOLE AND TRIMETHOPRIM 800; 160 MG/1; MG/1
1 TABLET ORAL 2 TIMES DAILY
Qty: 14 TABLET | Refills: 0 | Status: SHIPPED | OUTPATIENT
Start: 2023-11-08 | End: 2023-11-15

## 2023-11-08 RX ORDER — SODIUM CHLORIDE, SODIUM LACTATE, POTASSIUM CHLORIDE, CALCIUM CHLORIDE 600; 310; 30; 20 MG/100ML; MG/100ML; MG/100ML; MG/100ML
INJECTION, SOLUTION INTRAVENOUS CONTINUOUS
Status: DISCONTINUED | OUTPATIENT
Start: 2023-11-08 | End: 2023-11-08 | Stop reason: HOSPADM

## 2023-11-08 RX ORDER — DEXAMETHASONE SODIUM PHOSPHATE 4 MG/ML
INJECTION, SOLUTION INTRA-ARTICULAR; INTRALESIONAL; INTRAMUSCULAR; INTRAVENOUS; SOFT TISSUE PRN
Status: DISCONTINUED | OUTPATIENT
Start: 2023-11-08 | End: 2023-11-08 | Stop reason: SDUPTHER

## 2023-11-08 RX ADMIN — PHENYLEPHRINE HYDROCHLORIDE 50 MCG: 10 INJECTION INTRAVENOUS at 09:25

## 2023-11-08 RX ADMIN — PHENYLEPHRINE HYDROCHLORIDE 50 MCG: 10 INJECTION INTRAVENOUS at 09:12

## 2023-11-08 RX ADMIN — EPHEDRINE SULFATE 10 MG: 50 INJECTION, SOLUTION INTRAVENOUS at 09:26

## 2023-11-08 RX ADMIN — MIDAZOLAM 2 MG: 1 INJECTION INTRAMUSCULAR; INTRAVENOUS at 08:37

## 2023-11-08 RX ADMIN — EPHEDRINE SULFATE 5 MG: 50 INJECTION, SOLUTION INTRAVENOUS at 09:04

## 2023-11-08 RX ADMIN — ONDANSETRON 4 MG: 2 INJECTION INTRAMUSCULAR; INTRAVENOUS at 08:43

## 2023-11-08 RX ADMIN — PROPOFOL 150 MG: 10 INJECTION, EMULSION INTRAVENOUS at 08:43

## 2023-11-08 RX ADMIN — VANCOMYCIN HYDROCHLORIDE 1000 MG: 1 INJECTION, POWDER, LYOPHILIZED, FOR SOLUTION INTRAVENOUS at 07:56

## 2023-11-08 RX ADMIN — EPHEDRINE SULFATE 5 MG: 50 INJECTION, SOLUTION INTRAVENOUS at 09:02

## 2023-11-08 RX ADMIN — FENTANYL CITRATE 50 MCG: 50 INJECTION, SOLUTION INTRAMUSCULAR; INTRAVENOUS at 09:24

## 2023-11-08 RX ADMIN — SODIUM CHLORIDE, POTASSIUM CHLORIDE, SODIUM LACTATE AND CALCIUM CHLORIDE: 600; 310; 30; 20 INJECTION, SOLUTION INTRAVENOUS at 07:46

## 2023-11-08 RX ADMIN — PHENYLEPHRINE HYDROCHLORIDE 50 MCG: 10 INJECTION INTRAVENOUS at 09:07

## 2023-11-08 RX ADMIN — OXYCODONE HYDROCHLORIDE 5 MG: 5 TABLET ORAL at 10:17

## 2023-11-08 RX ADMIN — LIDOCAINE HYDROCHLORIDE 80 MG: 20 INJECTION, SOLUTION EPIDURAL; INFILTRATION; INTRACAUDAL; PERINEURAL at 08:43

## 2023-11-08 RX ADMIN — DEXAMETHASONE SODIUM PHOSPHATE 4 MG: 4 INJECTION, SOLUTION INTRAMUSCULAR; INTRAVENOUS at 08:43

## 2023-11-08 RX ADMIN — FENTANYL CITRATE 25 MCG: 50 INJECTION, SOLUTION INTRAMUSCULAR; INTRAVENOUS at 08:50

## 2023-11-08 RX ADMIN — HYDROMORPHONE HYDROCHLORIDE 0.5 MG: 2 INJECTION, SOLUTION INTRAMUSCULAR; INTRAVENOUS; SUBCUTANEOUS at 10:45

## 2023-11-08 RX ADMIN — FENTANYL CITRATE 25 MCG: 50 INJECTION, SOLUTION INTRAMUSCULAR; INTRAVENOUS at 08:48

## 2023-11-08 RX ADMIN — PHENYLEPHRINE HYDROCHLORIDE 50 MCG: 10 INJECTION INTRAVENOUS at 09:19

## 2023-11-08 RX ADMIN — EPHEDRINE SULFATE 10 MG: 50 INJECTION, SOLUTION INTRAVENOUS at 09:13

## 2023-11-08 ASSESSMENT — PAIN DESCRIPTION - PAIN TYPE
TYPE: SURGICAL PAIN

## 2023-11-08 ASSESSMENT — PAIN DESCRIPTION - LOCATION
LOCATION: BREAST

## 2023-11-08 ASSESSMENT — PAIN DESCRIPTION - ORIENTATION
ORIENTATION: LEFT;RIGHT
ORIENTATION: RIGHT;LEFT
ORIENTATION: LEFT;RIGHT
ORIENTATION: RIGHT;LEFT

## 2023-11-08 ASSESSMENT — PAIN DESCRIPTION - DESCRIPTORS
DESCRIPTORS: ACHING;SHOOTING
DESCRIPTORS: ACHING
DESCRIPTORS: ACHING;BURNING
DESCRIPTORS: ACHING

## 2023-11-08 ASSESSMENT — PAIN SCALES - GENERAL
PAINLEVEL_OUTOF10: 6
PAINLEVEL_OUTOF10: 4
PAINLEVEL_OUTOF10: 6
PAINLEVEL_OUTOF10: 3
PAINLEVEL_OUTOF10: 4
PAINLEVEL_OUTOF10: 8
PAINLEVEL_OUTOF10: 6

## 2023-11-08 ASSESSMENT — PAIN - FUNCTIONAL ASSESSMENT: PAIN_FUNCTIONAL_ASSESSMENT: 0-10

## 2023-11-08 NOTE — OP NOTE
Operative Note      Patient: Oliva Wells  YOB: 1975  MRN: [id number]    Date of Procedure: 11/8/2023    Pre-Op Diagnosis Codes:     * Personal history of malignant neoplasm of breast [Z85.3]     * Acquired absence of breast and nipple, bilateral [Z90.13]     * Capsular contracture of breast implant, sequela [T85.44XS]    Post-Op Diagnosis: Same       Procedure(s):  REVISION OF LEFT RECONSTRUCTION BREAST  RIGHT BREAST CAPSULOTOMY    Surgeon(s):  Ryann Bledsoe MD    Assistant:   First Assistant: Jenn Oleary    Anesthesia: General    Estimated Blood Loss (mL): Minimal    Complications: None    Specimens:   * No specimens in log *    Implants:  * No implants in log *      Drains: * No LDAs found *    Findings:         Detailed Description of Procedure:       Electronically signed by Ryann Bledsoe MD on 11/8/2023 at 9:49 AM

## 2023-11-08 NOTE — ANESTHESIA POSTPROCEDURE EVALUATION
Department of Anesthesiology  Postprocedure Note    Patient: Sandy Bryant  MRN: [id number]  YOB: 1975  Date of evaluation: 11/8/2023      Procedure Summary     Date: 11/08/23 Room / Location: [address]    Anesthesia Start: 9456 Anesthesia Stop: 0950    Procedures:       REVISION OF LEFT RECONSTRUCTION BREAST (Left: Breast)      RIGHT BREAST CAPSULOTOMY (Right: Breast) Diagnosis:       Personal history of malignant neoplasm of breast      Acquired absence of breast and nipple, bilateral      Capsular contracture of breast implant, sequela      (Personal history of malignant neoplasm of breast [Z85.3])      (Acquired absence of breast and nipple, bilateral [Z90.13])      (Capsular contracture of breast implant, sequela [T85.44XS])    Surgeons: Cara Peña MD Responsible Provider: Marco A Ortega MD    Anesthesia Type: General ASA Status: 3          Anesthesia Type: General    Kelly Phase I: Kelly Score: 10    Kelly Phase II:        Anesthesia Post Evaluation    Patient location during evaluation: PACU  Patient participation: complete - patient participated  Level of consciousness: awake  Airway patency: patent  Nausea & Vomiting: no vomiting and no nausea  Complications: no  Cardiovascular status: hemodynamically stable  Respiratory status: acceptable  Hydration status: stable  Multimodal analgesia pain management approach  Pain management: adequate

## 2023-11-08 NOTE — DISCHARGE INSTRUCTIONS
Keep head elevated, dressing dry, f/u 1 week. GENERAL SURGERY DISCHARGE INSTRUCTIONS    Follow your surgeons instructions. Follow up with your surgeon as directed. Observe the operative area for signs of excessive bleeding. If needed apply pressure,elevate if able and contact your surgeon. Observe the operative site for any signs of infection- such as increased pain,redness,fever greater than 101 degrees,swelling, foul odor or drainage. Contact your surgeon if any of these symptoms are present. Keep operative site clean and dry. Do not remove dressing unless instructed to by surgeon. Apply ice as directed. Avoid pulling,pushing or tugging to suture line. If you become short of breath call your doctor or go to the ER. Take medications as directed. Pain medication should be taken with food. Do not drive or operate machinery while taking narcotics. For any problems or question call your surgeon. ANESTHESIA DISCHARGE INSTRUCTIONS    Wear your seatbelt home. You are under the influence of drugs-do not drink alcohol, drive, operate machinery, make any important decisions or sign any legal documents for 24 hours. Children should not ride bikes, [place] or play on gym sets for 24 hours after surgery. A responsible adult needs to be with you for 24 hours. You may experience lightheadedness, dizziness, or sleepiness following surgery. Rest at home today- increase activity as tolerated. Progress slowly to a regular diet unless your physician has instructed you otherwise. Drink plenty of water. If persistent nausea and vomiting becomes a problem, call your physician. Coughing, sore throat and muscle aches are other side effects of anesthesia, and should improve with time. Do not drive or operate machinery while taking narcotics.

## 2023-11-08 NOTE — PROGRESS NOTES
Pt awake and alert at this time. Pt on RA, and VSS. Pt denies nausea, tolerating PO. Skin warm , palpable pulses and able to wiggle fingers and toes. Pt meets criteria to be discharged from Phase 1.

## 2023-11-08 NOTE — OP NOTE
[facility]                     [address], [address]                                OPERATIVE REPORT    PATIENT NAME: Laney Cruz                     :        1975  MED REC NO:   [id number]                          ROOM:  ACCOUNT NO:   [de-identified]                           ADMIT DATE: 2023  PROVIDER:     Michael Eugene MD    DATE OF PROCEDURE:  2023    PREOPERATIVE DIAGNOSIS:  Capsular contracture on the right breast.  This  was the deformity of the left reconstructed breast.    POSTOPERATIVE DIAGNOSIS:  Capsular contracture on the right breast.   This was the deformity of the left reconstructed breast.    OPERATION PERFORMED:  Bilateral breast revision with capsulotomy on the  right and revision of the left reconstructed breast by recreating the  left inframammary crease. SURGEON:  Michael Eugene MD    ANESTHESIA:  General.    ESTIMATED BLOOD LOSS:  Minimal.    INDICATIONS:  The patient is a 50-year-old white female who is well  known to me, who presented and underwent bilateral mastectomy and then  reconstruction using two-stage tissue expander. She did quiet well but  unfortunately had a recurrence on the right side and subsequently had  another lumpectomy and radiation treatment. This obviously had resulted  in significant capsular contracture and occasional pain. She was then  seen in consultation for revision of the bilateral reconstructed  breasts. The issue of bleeding, infection, wound dehiscence, scarring  as well as the issue of recurrent capsular formation as well as  persistent asymmetry was discussed and consent was obtained. OPERATIVE PROCEDURE:  The patient was taken to the operating room on  2023 and placed in the supine position, at which time general  anesthesia was obtained. The patient was marked while in the upright  position. Initially after general anesthesia, we prepped the chest  using ChloraPrep. Initial attention was placed towards the left side. The previous mastectomy incision was then made and implant removed,  which was intact. I then used a 2-0 Ethibond to close the inferior  border about a centimeter to elevate this and recreate the inframammary  crease on the left. Again, this was done using a running 2-0 Vicryl to  recreate the inframammary crease. I then did a superior capsulotomy. Hemostasis was then confirmed. I irrigated with Ancef and then replaced  the implant in appropriate orientation. I then infiltrated with 0.5%  plain Marcaine and closed using 2-0 Vicryl and 3-0 Monocryl. On the right side, again, the previous mastectomy incision was made and  the implant once again removed, which was intact. I then did a  circumferential capsulotomy but especially on the lateral upper border  as well as medial inferior border. I then did multiple radial  capsulotomies to try to improve the pocket size. After again confirming  hemostasis and irrigation with Ancef, I then replaced the implant again  in appropriate orientation and then once again closed it with 2-0 Vicryl  and 3-0 Monocryl. Steri-Strips, Telfa, and Tegaderm dressing were  applied. A compressive bra was placed and the patient was taken to the  recovery room in a satisfactory condition. No complications were noted  at the end of the procedure. Sponge counts were entirely correct. INSTRUCTIONS:  Keep the head elevated at all times. Follow up in one  week.   I did give her a script for Bactrim as well as Zhane Rodriguez MD    D: 11/08/2023 9:53:32       T: 11/08/2023 12:09:52     [email or web address]  Job#: [id number]     Doc#: [id number]    CC:

## 2024-02-26 RX ORDER — METOPROLOL SUCCINATE 25 MG/1
25 TABLET, EXTENDED RELEASE ORAL DAILY
Qty: 90 TABLET | Refills: 3 | Status: SHIPPED | OUTPATIENT
Start: 2024-02-26

## 2024-02-26 NOTE — TELEPHONE ENCOUNTER
Received refill request for metoprolol from  [facility].    Last ov: 03/29/2023 LES    Last Refill: 02/09/2023 #90 w/ 3 refills    Next appointment: 03/08/2024 LES   
PHYSICAL EXAM:  GENERAL: NAD, well-developed  HEAD:  Atraumatic, Normocephalic  EYES: EOMI, PERRLA, conjunctiva and sclera clear  NECK: Supple, No JVD  CHEST/LUNG: Scant crackle/wheeze, air movement equal  HEART: Regular rate and rhythm; No murmurs, rubs, or gallops  ABDOMEN: Soft, Nontender, Nondistended; Bowel sounds present  EXTREMITIES:  2+ Peripheral Pulses, 3+ Pitting edema. No clubbing, cyanosis.  NEUROLOGY: non-focal  SKIN: No rashes or lesions

## 2024-03-04 NOTE — PROGRESS NOTES
[facility]   Cardiac Follow Up     Referring Provider:  Sherrie Jefferson APRN     Chief Complaint   Patient presents with    Hypertension        History of Present Illness:  Natalya Daniel is a 48 y.o. female here in follow up  for chest pain and edema. She has a history of hypertension. Breast cancer s /p mastectomy. She is a nurse  for [employer].     At OV 8/29/22, she reported she just finished one round of chemo for recurrent breast cancer and starts a new one soon.     Admitted 9/27/22 with tachycardia at [facility]. She had just received chemo (Neulasta) 9/26. She was started on Toprol 25 BID. Lasix, diovan, HTCZ stopped due to hypotension.     She tested COVID positive 2/26/23 and was treated with paxlovid and Medrol dose gayathri.    She had breast reconstruction surgery 11/2023.     Today she is here for 12 mos follow up. She has been feeling good. Pt denies exertional chest pain, PEREZ/PND, palpitations, light-headedness, or edema except to right arm for which she wears a sleeve. BP is stable lately, highest sbp is 120 and lowest in the 100s. She does not feel heart racing. She is taking Toprol XL 25 mg daily and lasix daily. She sees NP Ronny.      Past Medical History:   has a past medical history of Arthritis, Breast cancer (HCC), Bronchitis, Cancer (HCC), Goiter, Hypertension, Idiopathic intracranial hypertension, Incisional hernia, Sleep apnea, Small bowel obstruction (HCC), Tachycardia, and Thyroid disease.    Surgical History:   has a past surgical history that includes Mastectomy (Bilateral, 02/10/2011); Axillary node dissection (Right, 2022); Total vaginal hysterectomy (2012); Incisional hernia repair (10/2012); Laparoscopic Nissen fundoplication (2021); Breast surgery (Right, 2018); Colonoscopy; skin biopsy; hernia repair; Hysterectomy; Breast surgery (Left, 11/8/2023); and Breast Capsulectomy (Right, 11/8/2023). bilateral mastectomy    Social History:   reports that she quit smoking

## 2024-03-08 ENCOUNTER — OFFICE VISIT (OUTPATIENT)
Dept: CARDIOLOGY CLINIC | Age: 49
End: 2024-03-08
Payer: COMMERCIAL

## 2024-03-08 VITALS
SYSTOLIC BLOOD PRESSURE: 100 MMHG | HEIGHT: 64 IN | BODY MASS INDEX: 27.9 KG/M2 | DIASTOLIC BLOOD PRESSURE: 70 MMHG | OXYGEN SATURATION: 97 % | WEIGHT: 163.4 LBS | HEART RATE: 92 BPM

## 2024-03-08 DIAGNOSIS — R00.0 TACHYCARDIA: ICD-10-CM

## 2024-03-08 DIAGNOSIS — R07.9 CHEST PAIN, UNSPECIFIED TYPE: Primary | ICD-10-CM

## 2024-03-08 DIAGNOSIS — I10 ESSENTIAL HYPERTENSION: ICD-10-CM

## 2024-03-08 DIAGNOSIS — G47.33 OSA (OBSTRUCTIVE SLEEP APNEA): ICD-10-CM

## 2024-03-08 PROCEDURE — 99214 OFFICE O/P EST MOD 30 MIN: CPT | Performed by: INTERNAL MEDICINE

## 2024-03-08 PROCEDURE — 3078F DIAST BP <80 MM HG: CPT | Performed by: INTERNAL MEDICINE

## 2024-03-08 PROCEDURE — 3074F SYST BP LT 130 MM HG: CPT | Performed by: INTERNAL MEDICINE

## 2024-03-08 NOTE — PATIENT INSTRUCTIONS
Continue risk factor modifications.   Call for any change in symptoms, call to report any changes in shortness of breath or development of chest pain with activity.    Follow up as needed

## 2025-02-10 NOTE — TELEPHONE ENCOUNTER
Received refill request for metoprolol from [facility] pharmacy.    Last ov: 03/08/2024 LES    Last Refill: 02/26/2024 #90 w/ 3    Next appointment:  none

## 2025-02-11 RX ORDER — METOPROLOL SUCCINATE 25 MG/1
25 TABLET, EXTENDED RELEASE ORAL DAILY
Qty: 90 TABLET | Refills: 3 | OUTPATIENT
Start: 2025-02-11

## 2025-02-11 NOTE — TELEPHONE ENCOUNTER
Spoke to Natalya.    She states this refill should have been sent to PCP.  She states she will address it.      Pt was seen by MORENA in March 2024 and recommended follow up PRN.

## 2025-07-20 ASSESSMENT — SLEEP AND FATIGUE QUESTIONNAIRES
HOW LIKELY ARE YOU TO NOD OFF OR FALL ASLEEP WHILE SITTING AND READING: MODERATE CHANCE OF DOZING
HOW LIKELY ARE YOU TO NOD OFF OR FALL ASLEEP IN A CAR, WHILE STOPPED FOR A FEW MINUTES IN TRAFFIC: WOULD NEVER DOZE
HOW LIKELY ARE YOU TO NOD OFF OR FALL ASLEEP WHILE SITTING AND TALKING TO SOMEONE: WOULD NEVER DOZE
HOW LIKELY ARE YOU TO NOD OFF OR FALL ASLEEP WHILE WATCHING TV: MODERATE CHANCE OF DOZING
HOW LIKELY ARE YOU TO NOD OFF OR FALL ASLEEP WHILE SITTING QUIETLY AFTER LUNCH WITHOUT ALCOHOL: SLIGHT CHANCE OF DOZING
HOW LIKELY ARE YOU TO NOD OFF OR FALL ASLEEP WHILE SITTING INACTIVE IN A PUBLIC PLACE: WOULD NEVER DOZE
HOW LIKELY ARE YOU TO NOD OFF OR FALL ASLEEP WHEN YOU ARE A PASSENGER IN A CAR FOR AN HOUR WITHOUT A BREAK: SLIGHT CHANCE OF DOZING
HOW LIKELY ARE YOU TO NOD OFF OR FALL ASLEEP WHILE SITTING AND TALKING TO SOMEONE: WOULD NEVER DOZE
HOW LIKELY ARE YOU TO NOD OFF OR FALL ASLEEP WHILE SITTING QUIETLY AFTER LUNCH WITHOUT ALCOHOL: SLIGHT CHANCE OF DOZING
HOW LIKELY ARE YOU TO NOD OFF OR FALL ASLEEP WHILE WATCHING TV: MODERATE CHANCE OF DOZING
HOW LIKELY ARE YOU TO NOD OFF OR FALL ASLEEP WHEN YOU ARE A PASSENGER IN A CAR FOR AN HOUR WITHOUT A BREAK: SLIGHT CHANCE OF DOZING
HOW LIKELY ARE YOU TO NOD OFF OR FALL ASLEEP WHILE LYING DOWN TO REST IN THE AFTERNOON WHEN CIRCUMSTANCES PERMIT: HIGH CHANCE OF DOZING
HOW LIKELY ARE YOU TO NOD OFF OR FALL ASLEEP WHILE LYING DOWN TO REST IN THE AFTERNOON WHEN CIRCUMSTANCES PERMIT: HIGH CHANCE OF DOZING
ESS TOTAL SCORE: 9
HOW LIKELY ARE YOU TO NOD OFF OR FALL ASLEEP WHILE SITTING AND READING: MODERATE CHANCE OF DOZING
HOW LIKELY ARE YOU TO NOD OFF OR FALL ASLEEP WHILE SITTING INACTIVE IN A PUBLIC PLACE: WOULD NEVER DOZE
HOW LIKELY ARE YOU TO NOD OFF OR FALL ASLEEP IN A CAR, WHILE STOPPED FOR A FEW MINUTES IN TRAFFIC: WOULD NEVER DOZE

## 2025-07-21 ENCOUNTER — OFFICE VISIT (OUTPATIENT)
Age: 50
End: 2025-07-21
Payer: COMMERCIAL

## 2025-07-21 VITALS
SYSTOLIC BLOOD PRESSURE: 104 MMHG | DIASTOLIC BLOOD PRESSURE: 76 MMHG | OXYGEN SATURATION: 98 % | HEIGHT: 64 IN | WEIGHT: 173.28 LBS | BODY MASS INDEX: 29.58 KG/M2 | HEART RATE: 93 BPM

## 2025-07-21 DIAGNOSIS — R53.83 FATIGUE, UNSPECIFIED TYPE: ICD-10-CM

## 2025-07-21 DIAGNOSIS — G47.10 HYPERSOMNIA: Primary | ICD-10-CM

## 2025-07-21 DIAGNOSIS — R06.83 SNORING: ICD-10-CM

## 2025-07-21 DIAGNOSIS — G47.33 OSA (OBSTRUCTIVE SLEEP APNEA): ICD-10-CM

## 2025-07-21 DIAGNOSIS — I10 HYPERTENSION, UNSPECIFIED TYPE: ICD-10-CM

## 2025-07-21 PROCEDURE — 99204 OFFICE O/P NEW MOD 45 MIN: CPT | Performed by: STUDENT IN AN ORGANIZED HEALTH CARE EDUCATION/TRAINING PROGRAM

## 2025-07-21 PROCEDURE — 3078F DIAST BP <80 MM HG: CPT | Performed by: STUDENT IN AN ORGANIZED HEALTH CARE EDUCATION/TRAINING PROGRAM

## 2025-07-21 PROCEDURE — 3074F SYST BP LT 130 MM HG: CPT | Performed by: STUDENT IN AN ORGANIZED HEALTH CARE EDUCATION/TRAINING PROGRAM

## 2025-07-21 RX ORDER — ALENDRONATE SODIUM 70 MG/1
70 TABLET ORAL
COMMUNITY
Start: 2025-06-25

## 2025-07-21 NOTE — PROGRESS NOTES
[facility]  Sleep Medicine  [address], [address]  [place] [address]    Chief Complaint   Patient presents with    New Patient     NP - has done SS twice through [facility], not currently on cpap.        Natalya Daniel is a 50 y.o. female who comes in for evaluation of previously diagnosed WALDEMAR. She was diagnosed about 10 years ago. Patient used to be on PAP therapy but patient stopped PAP therapy after about 18 months. Today she reports being tired during the day and sleepy.     Pertinent PMHx includes: WALDEMAR, anxiety, tachycardia, hx of breast CA, osteopenia, hypertension.    She goes to bed at about 7pm. She falls asleep in few minutes.  She awakens several times per night (due to pain). The average total amount of sleep is about 8-9 hours per night and takes no naps. She does use sleep aid/s: melatonin.  She continues to have complaints of daytime sleepiness, fatigue, and snoring. She does not have  symptoms consistent / suggestive of restless legs syndrome.   Caffeinated drinks/day: 1-2 cans of diet Mountain Dew, sometimes coffee  Alcohol drinks/day/week: a few drinks per month  Occupation: RN case management       DATA REVIEWED TODAY:    Diagnostic Review  Records of previous diagnostic sleep test report unavailable for my review at this time.    [place] & Insomnia Severity Index scores      7/20/2025     6:13 PM   Sleep Medicine   Sitting and reading 2   Watching TV 2   Sitting, inactive in a public place (e.g. a theatre or a meeting) 0   As a passenger in a car for an hour without a break 1   Lying down to rest in the afternoon when circumstances permit 3   Sitting and talking to someone 0   Sitting quietly after a lunch without alcohol 1   In a car, while stopped for a few minutes in traffic 0   [place] Sleepiness Score 9    Neck (Inches) 14       Patient-reported         7/20/2025     6:14 PM   Insomnia Severity Index (COV)   Difficulty falling asleep 0   Difficulty staying asleep

## 2025-07-21 NOTE — PATIENT INSTRUCTIONS
Patient information on the evaluation procedure for sleep apnea:    1. You are going to have a portable sleep study:  This is a home sleep study that will be done to see if you have obstructive sleep apnea. You will have a flow monitor on your nose, belt on your chest and a oxygen/heart rate monitor on your finger. Please do not wear nail polish on day of the study as it will interfere with the oxygen reading probe that is placed on your finger during the study.   Once you receive the device, you will also receive instructions on how to put it on and turn it on.  After 2 nights you will return it.    2. The sleep office will call you with the results a week after the study.     If the study showed that you have obstructive sleep apnea you will be told of the next step in your care. Commonly the recommended treatment is CPAP Therapy. If you agree to this therapy and you receive your CPAP before your next appointment, please bring it with you to your first follow-up appointment.      Patients who have obstructive sleep apnea on the sleep study and have chosen to try CPAP:  A home equipment company will contact you to set you up with a CPAP machine and fit you for a mask.    Please bring your CPAP, the mask and all supplies including the electrical cord with you to all your first follow up appointments with the sleep physician    Please keep trying to use your CPAP until you have seen in the sleep office in follow up as a lot of the problems patients have with CPAP can be addressed and corrected by your sleep provider.    Sleep center contact information:  [facility]: [phone]  [facility]: [phone]             What are the risk factors for Obstructive Sleep Apnea (WALDEMAR)?  Obesity  Snoring  Daytime sleepiness  Increasing age  Male gender  Taking sedating medications  Alcohol use  Hypertension  Stroke  Diabetes  Smoking     What is WALDEMAR?  People with WALDEMAR experience recurrent

## 2025-07-22 ENCOUNTER — TELEPHONE (OUTPATIENT)
Dept: SLEEP CENTER | Age: 50
End: 2025-07-22

## 2025-07-22 NOTE — TELEPHONE ENCOUNTER
Called to schedule a hst per Abhi     Left vm for the pt to return my call     [facility] insurance

## 2025-08-20 ENCOUNTER — HOSPITAL ENCOUNTER (OUTPATIENT)
Dept: SLEEP CENTER | Age: 50
Discharge: HOME | End: 2025-08-20
Payer: COMMERCIAL

## 2025-08-20 DIAGNOSIS — G47.33 OSA (OBSTRUCTIVE SLEEP APNEA): ICD-10-CM

## 2025-08-20 DIAGNOSIS — R53.83 FATIGUE, UNSPECIFIED TYPE: ICD-10-CM

## 2025-08-20 DIAGNOSIS — R06.83 SNORING: ICD-10-CM

## 2025-08-20 DIAGNOSIS — G47.10 HYPERSOMNIA: ICD-10-CM

## 2025-08-20 PROCEDURE — 95806 SLEEP STUDY UNATT&RESP EFFT: CPT

## 2025-08-25 ENCOUNTER — TELEPHONE (OUTPATIENT)
Dept: PULMONOLOGY | Age: 50
End: 2025-08-25

## 2025-08-25 PROBLEM — G47.10 HYPERSOMNIA: Status: ACTIVE | Noted: 2025-08-25

## (undated) DEVICE — GLOVE SURG SZ 65 THK91MIL LTX FREE SYN POLYISOPRENE

## (undated) DEVICE — PAD N ADH W3XL4IN POLY COT SFT PERF FLM EASILY CUT ABSRB

## (undated) DEVICE — Device

## (undated) DEVICE — BLANKET WRM W40.2XL55.9IN IORT LO BODY + MISTRAL AIR

## (undated) DEVICE — MASC TURNOVER KIT: Brand: MEDLINE INDUSTRIES, INC.

## (undated) DEVICE — PAD,ABDOMINAL,8"X10",ST,LF: Brand: MEDLINE

## (undated) DEVICE — SUTURE MCRYL SZ 3-0 L18IN ABSRB UD L19MM PS-2 3/8 CIR PRIM Y497G

## (undated) DEVICE — HYPODERMIC SAFETY NEEDLE: Brand: MAGELLAN

## (undated) DEVICE — 3M™ TEGADERM™ TRANSPARENT FILM DRESSING FRAME STYLE, 1626W, 4 IN X 4-3/4 IN (10 CM X 12 CM), 50/CT 4CT/CASE: Brand: 3M™ TEGADERM™

## (undated) DEVICE — INTENDED FOR TISSUE SEPARATION, AND OTHER PROCEDURES THAT REQUIRE A SHARP SURGICAL BLADE TO PUNCTURE OR CUT.: Brand: BARD-PARKER ® STAINLESS STEEL BLADES

## (undated) DEVICE — APPLICATOR MEDICATED 26 CC SOLUTION HI LT ORNG CHLORAPREP

## (undated) DEVICE — STAPLER SKIN H3.9MM WIRE DIA0.58MM CRWN 6.9MM 35 STPL ROT

## (undated) DEVICE — SPONGE LAP W18XL18IN WHT COT 4 PLY FLD STRUNG RADPQ DISP ST 2 PER PACK

## (undated) DEVICE — 3M™ STERI-STRIP™ REINFORCED ADHESIVE SKIN CLOSURES, R1547, 1/2 IN X 4 IN (12 MM X 100 MM), 6 STRIPS/ENVELOPE: Brand: 3M™ STERI-STRIP™

## (undated) DEVICE — SUTURE VCRL + SZ 2-0 L27IN ABSRB WHT SH 1/2 CIR TAPERCUT VCP417H

## (undated) DEVICE — Z DISCONTINUED USE 2857930 NEEDLE HYPO 25GA L1.5IN BVL ORIENTED ECLIPSE

## (undated) DEVICE — 3M™ TEGADERM™ TRANSPARENT FILM DRESSING FRAME STYLE, 1627, 4 IN X 10 IN (10 CM X 25 CM), 20/CT 4CT/CASE: Brand: 3M™ TEGADERM™

## (undated) DEVICE — PACK PROCEDURE SURG EXTREMITY MFFOP CUST

## (undated) DEVICE — YANKAUER,BULB TIP,W/O VENT,RIGID,STERILE: Brand: MEDLINE

## (undated) DEVICE — MASTISOL ADHESIVE LIQ 2/3ML

## (undated) DEVICE — MEDICINE CUP, GRADUATED, STER: Brand: MEDLINE

## (undated) DEVICE — SUTURE ETHBND EXCEL SZ 2-0 L36IN NONABSORBABLE GRN L26MM SH X523H

## (undated) DEVICE — DRAPE,CHEST,FENES,15X10,STERIL: Brand: MEDLINE

## (undated) DEVICE — SOLUTION IRRIG 500ML 0.9% SOD CHLO USP POUR PLAS BTL

## (undated) DEVICE — TOWEL,OR,DSP,ST,BLUE,STD,4/PK,20PK/CS: Brand: MEDLINE

## (undated) DEVICE — PENCIL SMK EVAC TELSCP 3 M TBNG